# Patient Record
Sex: FEMALE | Race: BLACK OR AFRICAN AMERICAN | NOT HISPANIC OR LATINO | URBAN - METROPOLITAN AREA
[De-identification: names, ages, dates, MRNs, and addresses within clinical notes are randomized per-mention and may not be internally consistent; named-entity substitution may affect disease eponyms.]

---

## 2018-10-23 ENCOUNTER — APPOINTMENT (RX ONLY)
Dept: URBAN - METROPOLITAN AREA CLINIC 170 | Facility: CLINIC | Age: 28
Setting detail: DERMATOLOGY
End: 2018-10-23

## 2018-10-23 DIAGNOSIS — Z71.89 OTHER SPECIFIED COUNSELING: ICD-10-CM

## 2018-10-23 DIAGNOSIS — L50.9 URTICARIA, UNSPECIFIED: ICD-10-CM

## 2018-10-23 DIAGNOSIS — D22 MELANOCYTIC NEVI: ICD-10-CM

## 2018-10-23 DIAGNOSIS — L81.4 OTHER MELANIN HYPERPIGMENTATION: ICD-10-CM

## 2018-10-23 DIAGNOSIS — L85.3 XEROSIS CUTIS: ICD-10-CM

## 2018-10-23 PROBLEM — L29.8 OTHER PRURITUS: Status: ACTIVE | Noted: 2018-10-23

## 2018-10-23 PROBLEM — D22.5 MELANOCYTIC NEVI OF TRUNK: Status: ACTIVE | Noted: 2018-10-23

## 2018-10-23 PROCEDURE — 99203 OFFICE O/P NEW LOW 30 MIN: CPT

## 2018-10-23 PROCEDURE — ? PRESCRIPTION

## 2018-10-23 PROCEDURE — ? COUNSELING

## 2018-10-23 PROCEDURE — ? PATIENT SPECIFIC COUNSELING

## 2018-10-23 RX ORDER — TRIAMCINOLONE ACETONIDE 1 MG/G
CREAM TOPICAL
Qty: 1 | Refills: 0 | Status: ERX | COMMUNITY
Start: 2018-10-23

## 2018-10-23 RX ADMIN — TRIAMCINOLONE ACETONIDE: 1 CREAM TOPICAL at 19:14

## 2018-10-23 ASSESSMENT — LOCATION DETAILED DESCRIPTION DERM
LOCATION DETAILED: EPIGASTRIC SKIN
LOCATION DETAILED: RIGHT INFERIOR MEDIAL UPPER BACK
LOCATION DETAILED: RIGHT MID-UPPER BACK
LOCATION DETAILED: RIGHT SUPERIOR UPPER BACK
LOCATION DETAILED: LEFT SUPERIOR UPPER BACK

## 2018-10-23 ASSESSMENT — LOCATION SIMPLE DESCRIPTION DERM
LOCATION SIMPLE: RIGHT UPPER BACK
LOCATION SIMPLE: ABDOMEN
LOCATION SIMPLE: LEFT UPPER BACK

## 2018-10-23 ASSESSMENT — LOCATION ZONE DERM: LOCATION ZONE: TRUNK

## 2018-10-23 NOTE — PROCEDURE: PATIENT SPECIFIC COUNSELING
Patient advised to use topical TAC when she develops flares. Recommend consult with allergist for allergy testing to determine trigger. Patient advised to take daily Zyrtec or Allegra and Benadryl at night PRN itch.
Detail Level: Zone
Samples of Eucerin and Cerave given to patient to use 3-4 times a day for dryness. Recommend Dove Sensitive skin body wash daily.
Detail Level: Simple

## 2018-10-23 NOTE — HPI: RASH
What Type Of Note Output Would You Prefer (Optional)?: Standard Output
How Severe Is Your Rash?: mild
Is This A New Presentation, Or A Follow-Up?: Rash
Additional History: On off uses triamcinolone and mupirocin  patient stated looked like hives , eczema.

## 2018-11-13 ENCOUNTER — APPOINTMENT (RX ONLY)
Dept: URBAN - METROPOLITAN AREA CLINIC 170 | Facility: CLINIC | Age: 28
Setting detail: DERMATOLOGY
End: 2018-11-13

## 2018-11-13 DIAGNOSIS — L50.9 URTICARIA, UNSPECIFIED: ICD-10-CM

## 2018-11-13 PROCEDURE — ? PATIENT SPECIFIC COUNSELING

## 2018-11-13 PROCEDURE — 99213 OFFICE O/P EST LOW 20 MIN: CPT

## 2018-11-13 PROCEDURE — ? COUNSELING

## 2018-11-13 ASSESSMENT — LOCATION DETAILED DESCRIPTION DERM
LOCATION DETAILED: RIGHT INFERIOR MEDIAL UPPER BACK
LOCATION DETAILED: LEFT SUPERIOR UPPER BACK
LOCATION DETAILED: RIGHT SUPERIOR UPPER BACK

## 2018-11-13 ASSESSMENT — LOCATION ZONE DERM: LOCATION ZONE: TRUNK

## 2018-11-13 ASSESSMENT — LOCATION SIMPLE DESCRIPTION DERM
LOCATION SIMPLE: LEFT UPPER BACK
LOCATION SIMPLE: RIGHT UPPER BACK

## 2018-11-13 NOTE — PROCEDURE: PATIENT SPECIFIC COUNSELING
Patient consulted with allergist and primary care, a full panel of blood work was done and findings were negative. A CT of the chest was performed due to patient complaining of tightness and results were negative. Allergist stated that patient has no overt allergies. Advise patient she likely has atopic dermatitis that is flaring due to the weather changes and dryness. Patient has not use triamcinolone cream that was prescribed at previous visit. I advise she should start using topical steroid twice a day while she is flared and continue to moisturize with Eucerin and Cerave creams. Patient to take antihistamines to help calm down any immune mediated response. Patient complained of nerve twitching in left arm and left face, advised I recommended a consult with a neurologist and cardiovascular specialist to rule out any neuropathic pain or cardiovascular complications. All questions answered patient understands and agrees.
Detail Level: Zone

## 2023-02-19 NOTE — HPI: FOLLOW-UP
What Is The Condition That You Are Returning For Follow-Up?: other
[facility]  [place], [address] [facility]  [phone]    Omar Tristanian  : 1942     HPI   [de-identified] y.o., female seen in consultation for incidental L renal lesions. Pt admitted for dypsnea. Work up revealed Pe's. Remains on heparin gtt. CT chest on 23 also shows a LUP hyperdense focus and complex heterogenous L renal mass. CT renal mass protocol is pending. She denies any flank pain or hematuria. No FH of CaK.       Past Medical History:   Diagnosis Date    Arthritis     Chronic pain     hands and toes    Former smoker, stopped smoking in distant past     GERD (gastroesophageal reflux disease)     controlled with med    High cholesterol     on medication    Hypertension     Morbid obesity (HCC)     Nausea & vomiting     Pre-diabetes     Status post total left knee replacement 2017    Urinary incontinence     med     Past Surgical History:   Procedure Laterality Date    BREAST SURGERY      Imer. breast bx    GYN      Hysterectomy    HEENT      T&A    TAMIA BIOPSY BREAST STEREOTACTIC  over 20 yrs ago    TOTAL KNEE ARTHROPLASTY Right     [address] [facility]  over 20 yrs ago     Current Facility-Administered Medications   Medication Dose Route Frequency Provider Last Rate Last Admin    iopamidol (ISOVUE-370) 76 % injection 100 mL  100 mL IntraVENous ONCE PRN Subash MD Scott        potassium chloride (KLOR-CON M) extended release tablet 40 mEq  40 mEq Oral TID WC Xiomara Rendon MD   40 mEq at 23 1116    amLODIPine (NORVASC) tablet 10 mg  10 mg Oral Daily Xiomara Rendon MD   10 mg at 23 0815    atorvastatin (LIPITOR) tablet 10 mg  10 mg Oral Daily Xiomara Rendon MD   10 mg at 23 0816    lisinopril (PRINIVIL;ZESTRIL) tablet 10 mg  10 mg Oral Daily Xiomara Rendon MD   10 mg at 23 0816    pantoprazole (PROTONIX) tablet 40 mg  40 mg Oral QAM AC Xiomara Rendon MD   40 mg at 23 0611    ipratropium-albuterol (DUONEB) nebulizer
Additional History: Patient currently using steroid cream. PCP did blood work everything was normal. Also seen allergist was negative
solution 1 ampule  1 ampule Inhalation Q4H WA Herrera Richardson MD   1 ampule at 02/19/23 0750    0.9 % sodium chloride bolus  100 mL IntraVENous ONCE PRN Herrera Richardson MD        sodium chloride flush 0.9 % injection 10 mL  10 mL IntraVENous ONCE PRN Herrera Richardson MD        heparin (porcine) injection 6,750 Units  80 Units/kg IntraVENous PRN Herrera Richardson MD        heparin (porcine) injection 3,380 Units  40 Units/kg IntraVENous PRN Herrera Richardson MD        heparin 25,000 units in dextrose 5% 250 mL (premix) infusion  5-30 Units/kg/hr IntraVENous Continuous Herrera Richardson MD 15.2 mL/hr at 02/19/23 0813 18 Units/kg/hr at 02/19/23 0813    sodium chloride flush 0.9 % injection 5-40 mL  5-40 mL IntraVENous 2 times per day Zacarias Tejeda MD   10 mL at 02/19/23 0821    sodium chloride flush 0.9 % injection 5-40 mL  5-40 mL IntraVENous PRN Jamal Wilhelm MD        0.9 % sodium chloride infusion   IntraVENous PRN Jamal Wilheml MD        ondansetron (ZOFRAN-ODT) disintegrating tablet 4 mg  4 mg Oral Q8H PRN Jamal Wilhelm MD        Or    ondansetron (ZOFRAN) injection 4 mg  4 mg IntraVENous Q6H PRN Jamal Wilhelm MD        polyethylene glycol (GLYCOLAX) packet 17 g  17 g Oral Daily PRN Zacarias Tejeda MD        acetaminophen (TYLENOL) tablet 650 mg  650 mg Oral Q6H PRN Zacarias Tejeda MD   650 mg at 02/18/23 1531    Or    acetaminophen (TYLENOL) suppository 650 mg  650 mg Rectal Q6H PRN Jamal Wilhelm MD        0.9 % sodium chloride infusion   IntraVENous Continuous Jamal Wilhelm MD        insulin lispro (HUMALOG) injection vial 0-8 Units  0-8 Units SubCUTAneous TID WC Jamal Wilhelm MD        insulin lispro (HUMALOG) injection vial 0-4 Units  0-4 Units SubCUTAneous Nightly Jamal Wilhelm MD        labetalol (NORMODYNE;TRANDATE) injection 10 mg  10 mg IntraVENous Q6H PRN Jamal Wilhelm MD        hydrALAZINE (APRESOLINE) injection 10 mg  10 mg IntraVENous Q4H PRN Zacarias Tejeda MD        glucose
chewable tablet 16 g  4 tablet Oral PRN Jamal Wilhelm MD        dextrose bolus 10% 125 mL  125 mL IntraVENous PRN Jamal Wilhelm MD        Or    dextrose bolus 10% 250 mL  250 mL IntraVENous PRN Jamal Wilhelm MD        glucagon (rDNA) injection 1 mg  1 mg SubCUTAneous PRN Jamal Wilhelm MD        dextrose 10 % infusion   IntraVENous Continuous PRN Jamal Wilhelm MD         Allergies   Allergen Reactions    Latex Itching     redness    Penicillins Swelling    Sulfa Antibiotics Swelling     Social History     Socioeconomic History    Marital status:      Spouse name: Not on file    Number of children: Not on file    Years of education: Not on file    Highest education level: Not on file   Occupational History    Not on file   Tobacco Use    Smoking status: Former    Smokeless tobacco: Never    Tobacco comments:     Quit smoking: \"40 years ago\"   Vaping Use    Vaping Use: Never used   Substance and Sexual Activity    Alcohol use: Yes    Drug use: No    Sexual activity: Not on file   Other Topics Concern    Not on file   Social History Narrative    Not on file     Social Determinants of Health     Financial Resource Strain: Not on file   Food Insecurity: Not on file   Transportation Needs: Not on file   Physical Activity: Not on file   Stress: Not on file   Social Connections: Not on file   Intimate Partner Violence: Not on file   Housing Stability: Not on file     Family History   Problem Relation Age of Onset    Delayed Awakening Neg Hx     Post-op Nausea/Vomiting Neg Hx     Emergence Delirium Neg Hx     Post-op Cognitive Dysfunction Neg Hx     Other Neg Hx     Breast Cancer Neg Hx     Malig Hypertherm Neg Hx     Pseudochol. Deficiency Neg Hx     Cancer Sister     Hypertension Father     Elevated Lipids Father     Cancer Father     Elevated Lipids Mother     Heart Disease Mother     Hypertension Mother        Review of Systems  All systems reviewed and are negative at this time.     Physical Exam  BP (!)
121/94   Pulse 87   Temp 98.4 °F (36.9 °C) (Oral)   Resp 18   Ht 5' 5\" (1.651 m)   Wt 186 lb (84.4 kg)   SpO2 95%   BMI 30.95 kg/m²   General appearance - alert, well appearing, and in no distress  Mental status - alert, oriented to person, place, and time  Eyes - extraocular eye movements intact, sclera anicteric  Nose - normal and patent, no erythema, discharge or polyps  Mouth - mucous membranes moist  Abdomen - soft, nontender, nondistended, no masses or organomegaly  Lymphatic-  No palpable lymphadenopathy  Neurological -  normal speech, no focal findings or movement disorder noted  Musculoskeletal - no deformity or swelling  Extremities - no pedal edema, no clubbing or cyanosis  Skin - normal coloration and turgor      Urinalysis  UA - Dipstick  Results for orders placed or performed during the hospital encounter of 02/17/23   CBC with Auto Differential   Result Value Ref Range    WBC 9.1 4.3 - 11.1 K/uL    RBC 4.47 4.05 - 5.2 M/uL    Hemoglobin 11.1 (L) 11.7 - 15.4 g/dL    Hematocrit 36.2 35.8 - 46.3 %    MCV 81.0 (L) 82 - 102 FL    MCH 24.8 (L) 26.1 - 32.9 PG    MCHC 30.7 (L) 31.4 - 35.0 g/dL    RDW 16.1 (H) 11.9 - 14.6 %    Platelets 859 148 - 608 K/uL    MPV 10.0 9.4 - 12.3 FL    nRBC 0.00 0.0 - 0.2 K/uL    Differential Type AUTOMATED      Seg Neutrophils 78 43 - 78 %    Lymphocytes 13 13 - 44 %    Monocytes 8 4.0 - 12.0 %    Eosinophils % 1 0.5 - 7.8 %    Basophils 0 0.0 - 2.0 %    Immature Granulocytes 0 0.0 - 5.0 %    Segs Absolute 6.9 1.7 - 8.2 K/UL    Absolute Lymph # 1.2 0.5 - 4.6 K/UL    Absolute Mono # 0.7 0.1 - 1.3 K/UL    Absolute Eos # 0.1 0.0 - 0.8 K/UL    Basophils Absolute 0.0 0.0 - 0.2 K/UL    Absolute Immature Granulocyte 0.0 0.0 - 0.5 K/UL   CMP   Result Value Ref Range    Sodium 138 133 - 143 mmol/L    Potassium 3.3 (L) 3.5 - 5.1 mmol/L    Chloride 106 101 - 110 mmol/L    CO2 27 21 - 32 mmol/L    Anion Gap 5 2 - 11 mmol/L    Glucose 131 (H) 65 - 100 mg/dL    BUN 15 8 - 23 MG/DL
Creatinine 1.00 0.6 - 1.0 MG/DL    Est, Glom Filt Rate 57 (L) >60 ml/min/1.73m2    Calcium 9.1 8.3 - 10.4 MG/DL    Total Bilirubin 0.6 0.2 - 1.1 MG/DL    ALT 28 12 - 65 U/L    AST 25 15 - 37 U/L    Alk Phosphatase 102 50 - 136 U/L    Total Protein 7.8 6.3 - 8.2 g/dL    Albumin 3.5 3.2 - 4.6 g/dL    Globulin 4.3 2.8 - 4.5 g/dL    Albumin/Globulin Ratio 0.8 0.4 - 1.6     Lipase   Result Value Ref Range    Lipase 107 73 - 393 U/L   Magnesium   Result Value Ref Range    Magnesium 1.8 1.8 - 2.4 mg/dL   Lactate, Sepsis   Result Value Ref Range    Lactic Acid, Sepsis 1.2 0.4 - 2.0 MMOL/L   Lactate, Sepsis   Result Value Ref Range    Lactic Acid, Sepsis 1.3 0.4 - 2.0 MMOL/L   Troponin   Result Value Ref Range    Troponin, High Sensitivity 344.4 (HH) 0 - 14 pg/mL   Brain Natriuretic Peptide   Result Value Ref Range    NT Pro- (H) <450 PG/ML   Troponin   Result Value Ref Range    Troponin, High Sensitivity 296.7 (HH) 0 - 14 pg/mL   APTT   Result Value Ref Range    PTT 21.0 (L) 24.5 - 34.2 SEC   Protime-INR   Result Value Ref Range    Protime 13.7 12.6 - 14.3 sec    INR 1.0     Anti-Xa, Unfractionated Heparin   Result Value Ref Range    Anti-XA Unfrac Heparin 0.59 0.3 - 0.7 IU/mL   Comprehensive Metabolic Panel w/ Reflex to MG   Result Value Ref Range    Sodium 137 133 - 143 mmol/L    Potassium 3.2 (L) 3.5 - 5.1 mmol/L    Chloride 105 101 - 110 mmol/L    CO2 25 21 - 32 mmol/L    Anion Gap 7 2 - 11 mmol/L    Glucose 115 (H) 65 - 100 mg/dL    BUN 15 8 - 23 MG/DL    Creatinine 0.80 0.6 - 1.0 MG/DL    Est, Glom Filt Rate >60 >60 ml/min/1.73m2    Calcium 8.7 8.3 - 10.4 MG/DL    Total Bilirubin 0.4 0.2 - 1.1 MG/DL    ALT 28 12 - 65 U/L    AST 32 15 - 37 U/L    Alk Phosphatase 93 50 - 136 U/L    Total Protein 7.0 6.3 - 8.2 g/dL    Albumin 3.1 (L) 3.2 - 4.6 g/dL    Globulin 3.9 2.8 - 4.5 g/dL    Albumin/Globulin Ratio 0.8 0.4 - 1.6     CBC with Auto Differential   Result Value Ref Range    WBC 9.4 4.3 - 11.1 K/uL    RBC 4.07
4.05 - 5.2 M/uL    Hemoglobin 10.2 (L) 11.7 - 15.4 g/dL    Hematocrit 33.0 (L) 35.8 - 46.3 %    MCV 81.1 (L) 82 - 102 FL    MCH 25.1 (L) 26.1 - 32.9 PG    MCHC 30.9 (L) 31.4 - 35.0 g/dL    RDW 16.0 (H) 11.9 - 14.6 %    Platelets 310 267 - 933 K/uL    MPV 10.7 9.4 - 12.3 FL    nRBC 0.00 0.0 - 0.2 K/uL    Differential Type AUTOMATED      Seg Neutrophils 71 43 - 78 %    Lymphocytes 17 13 - 44 %    Monocytes 9 4.0 - 12.0 %    Eosinophils % 3 0.5 - 7.8 %    Basophils 0 0.0 - 2.0 %    Immature Granulocytes 0 0.0 - 5.0 %    Segs Absolute 6.7 1.7 - 8.2 K/UL    Absolute Lymph # 1.6 0.5 - 4.6 K/UL    Absolute Mono # 0.8 0.1 - 1.3 K/UL    Absolute Eos # 0.3 0.0 - 0.8 K/UL    Basophils Absolute 0.0 0.0 - 0.2 K/UL    Absolute Immature Granulocyte 0.0 0.0 - 0.5 K/UL   Magnesium   Result Value Ref Range    Magnesium 1.8 1.8 - 2.4 mg/dL   Anti-Xa, Unfractionated Heparin   Result Value Ref Range    Anti-XA Unfrac Heparin 0.43 0.3 - 0.7 IU/mL   Anti-Xa, Unfractionated Heparin   Result Value Ref Range    Anti-XA Unfrac Heparin 0.47 0.3 - 0.7 IU/mL   Anti-Xa, Unfractionated Heparin   Result Value Ref Range    Anti-XA Unfrac Heparin 0.45 0.3 - 0.7 IU/mL   Anti-Xa, Unfractionated Heparin   Result Value Ref Range    Anti-XA Unfrac Heparin 0.47 0.3 - 0.7 IU/mL   POCT Glucose   Result Value Ref Range    POC Glucose 148 (H) 65 - 100 mg/dL    Performed by: [email or web address]    POCT Glucose   Result Value Ref Range    POC Glucose 144 (H) 65 - 100 mg/dL    Performed by: [email or web address]    POCT Glucose   Result Value Ref Range    POC Glucose 175 (H) 65 - 100 mg/dL    Performed by: Don Lobo    POCT Glucose   Result Value Ref Range    POC Glucose 232 (H) 65 - 100 mg/dL    Performed by: Ricardo    POCT Glucose   Result Value Ref Range    POC Glucose 128 (H) 65 - 100 mg/dL    Performed by:  Jailyn    EKG 12 Lead   Result Value Ref Range    Ventricular Rate 93 BPM    Atrial Rate 93 BPM    P-R Interval 152 ms    QRS Duration 94 ms    Q-T
Interval 364 ms    QTc Calculation (Bazett) 452 ms    P Axis 76 degrees    R Axis 63 degrees    T Axis 55 degrees    Diagnosis Normal sinus rhythm            Assessment/Plan  Incidental L renal masses. CT renal mass protocol ordered and pending. Further recs following CT.     JAZIEL COREY, DO